# Patient Record
Sex: FEMALE | Race: WHITE
[De-identification: names, ages, dates, MRNs, and addresses within clinical notes are randomized per-mention and may not be internally consistent; named-entity substitution may affect disease eponyms.]

---

## 2018-01-25 ENCOUNTER — HOSPITAL ENCOUNTER (OUTPATIENT)
Dept: HOSPITAL 63 - MAMMO | Age: 73
Discharge: HOME | End: 2018-01-25
Attending: INTERNAL MEDICINE
Payer: COMMERCIAL

## 2018-01-25 DIAGNOSIS — Z12.31: Primary | ICD-10-CM

## 2018-01-25 PROCEDURE — 77063 BREAST TOMOSYNTHESIS BI: CPT

## 2018-01-25 PROCEDURE — 77067 SCR MAMMO BI INCL CAD: CPT

## 2018-01-26 NOTE — RAD
DATE: 1/25/2018



EXAM: MAMMO YESENIA SCREENING BILATERAL



HISTORY: Routine screening



COMPARISON: 1/24/2017



This study was interpreted with the benefit of Computerized Aided Detection

(CAD).





The breast parenchyma is heterogeneously dense, which could reduce sensitivity

of mammography. Breast parenchyma level C.





FINDINGS: 2-D and 3-D tomosynthesis imaging was performed in CC and MLO

projections.  There are stable lymph node type densities present laterally in

both breasts.  No new or enlarging breast densities are seen.  Benign type

calcifications are present.  No suspicious microcalcifications have developed.

 



IMPRESSION: Stable mammograms without evidence of malignancy.





BI-RADS CATEGORY: 2 BENIGN FINDING(S)



RECOMMENDED FOLLOW-UP: 12M 12 MONTH FOLLOW-UP



PQRS compliance statement: Patient information was entered into a reminder

system with a target due date     for the next mammogram.



Mammography is a sensitive method for finding small breast cancers, but it

does not detect them all and is not a substitute for careful clinical

examination.  A negative mammogram does not negate a clinically suspicious

finding and should not result in delay in biopsying a clinically suspicious

abnormality.



"Our facility is accredited by the [employer] Mammography

Program."

## 2019-02-26 NOTE — RAD
DATE: February 25, 2019



EXAM: MAMMO YESENIA SCREENING BILATERAL



HISTORY: Screening study.



Comparison: 2017 and 2018.



2-D digital mammographic views of both breasts were performed in the CC and

MLO projections. 3-D digital tomosynthesis images of both breasts were

performed in the CC and MLO projections and reviewed on a computer

workstation.



This study was interpreted with the benefit of Computerized Aided Detection

(CAD).







FINDINGS:



Breast Density: DENSE The breast parenchyma is dense, which could reduce the

sensitivity of mammography. Breast parenchyma level density D..  There are no

dominant suspicious masses, suspicious microcalcifications or evidence of

architectural distortion.



 Benign bilateral intramammary lymph nodes are seen which are stable.  







IMPRESSION: No mammographic indicators for malignancy.







BI-RADS CATEGORY: 2 BENIGN FINDING



RECOMMENDED FOLLOW-UP: 12M 12 MONTH FOLLOW-UP



PQRS compliance statement: Patient information was entered into a reminder

system with a target due date February 26, 2020 for the next mammogram.



Mammography is a sensitive method for finding small breast cancers, but it

does not detect them all and is not a substitute for careful clinical

examination.  A negative mammogram does not negate a clinically suspicious

finding and should not result in delay in biopsying a clinically suspicious

abnormality.



"Our facility is accredited by the [employer] Mammography

Program."



The patient's breast density may affect the ability of mammography to detect

breast cancer. There are 4 categories of breast density, A, B, C and D. Breast

density A means that most of the breast tissue is replaced with adipose tissue

and therefore is not dense. Breast density B means that the breast tissue is

mildly dense and scattered. Breast density C means that the breast tissue is

heterogeneously dense. Breast density D means that the breast tissue is very

dense. Breast densities especially C and D may decrease the sensitivity of

mammography to detect breast cancer. Therefore, the patient may benefit from

3-D breast mammography (3D breast tomography) as a part of their screening

mammogram. Insurance may or may not pay for this additional imaging. The

patient's breast density based on today's mammogram is category D.

## 2020-02-28 NOTE — RAD
History:  Routine screening.



Technique:  Bilateral digital mammographic routine views were obtained with

2-D and 3-D technique, including use of CAD - computer aided detection.



Comparison: 2/25/2019, 1/25/2018.



Findings:



Breast Tissue Density D :The breast tissue is extremely dense, lowering the

sensitivity of the examination.



There are no suspicious masses, microcalcifications or areas of architectural

distortion.

  

Impression:



Dense breast tissue.

No suspicious abnormality noted.



BI-RADS Category 1:  Negative.



Normal interval followup.



Your mammogram demonstrates that you have dense breast tissue, which could

hide abnormalities, and if you have other risk factors for breast cancer that

have been identified, you might benefit from supplemental screening tests that

may be suggested by your ordering physician.  Dense breast tissue, in and of

itself, is a relatively common condition.  This information is not provided to

cause undue concern, but rather to raise your awareness and to promote

discussion with your physician regarding the presence of other risk factors,

in addition to dense breast tissue. A report of your mammography results will

be sent to you and your physician.  You should contact your physician if you

have any questions or concerns regarding this report.



A mammogram does not have 100% sensitivity and therefore a negative imaging

study should not delay further work up of a suspicious abnormality.







The patient will receive a letter with the results in the mail.



Patient information is entered into the reminder system with a target due date

for the next screening mammogram.  The patient will receive a reminder.



"Our facility is accredited by the [employer] Mammography

Program."



BI-RADS 1 -- negative findings (within normal)

## 2021-03-16 NOTE — RAD
DATE: 3/15/2021 10:54 AM



EXAM: DIGITAL SCREEN BILAT W/CAD



HISTORY:  Screening



COMPARISON: 2/27/2020



Bilateral full field craniocaudal and mediolateral oblique images were

obtained using digital technique.



This study was interpreted with the benefit of Computerized Aided Detection

(CAD).





FINDINGS:



Breast Density: DENSE  The breast Parenchyma is dense, which could reduce the

sensitivity of mammography. Breast parenchyma level density D.





No suspicious masses, microcalcifications or architectural distortion is

present to suggest malignancy in either breast.





The visualized axillae are unremarkable. 



IMPRESSION: No mammographic evidence of malignancy. 



BI-RADS CATEGORY: 1 NEGATIVE



RECOMMENDED FOLLOW-UP: 12M 12 MONTH FOLLOW-UP Annual screening mammography is

recommended, unless clinically indicated sooner based on symptoms or change in

physical exam.



PQRS compliance statement: Patient information was entered into a reminder

system with a target due date for the next mammogram.



Mammography is a sensitive method for finding small breast cancers, but it

does not detect them all and is not a substitute for careful clinical

examination.  A negative mammogram does not negate a clinically suspicious

finding and should not result in delay in biopsying a clinically suspicious

abnormality.



"Our facility is accredited by the [employer] Mammography

Program."

## 2021-09-20 NOTE — RAD
Two-view chest



HISTORY: Dyspnea



PA and lateral view chest



The heart and pulmonary vessels appear normal. This patchy opacities of lungs. The pleural margins ar
e clear. There is right-sided Port-A-Cath. Lungs are hyperinflated.



IMPRESSION:



Reticular opacities are nonspecific. Is acute this could be atypical pneumonia however if chronic thi
s could be pulmonary fibrosis.



Electronically signed by: Blair Williamson III, MD (9/20/2021 3:54 PM) [facility]